# Patient Record
Sex: FEMALE | Race: WHITE | NOT HISPANIC OR LATINO | URBAN - METROPOLITAN AREA
[De-identification: names, ages, dates, MRNs, and addresses within clinical notes are randomized per-mention and may not be internally consistent; named-entity substitution may affect disease eponyms.]

---

## 2017-01-25 ENCOUNTER — OFFICE (AMBULATORY)
Dept: URBAN - METROPOLITAN AREA CLINIC 75 | Facility: CLINIC | Age: 42
End: 2017-01-25
Payer: COMMERCIAL

## 2017-01-25 VITALS
HEIGHT: 69 IN | DIASTOLIC BLOOD PRESSURE: 80 MMHG | WEIGHT: 236 LBS | SYSTOLIC BLOOD PRESSURE: 124 MMHG | HEART RATE: 72 BPM

## 2017-01-25 DIAGNOSIS — R13.10 DYSPHAGIA, UNSPECIFIED: ICD-10-CM

## 2017-01-25 DIAGNOSIS — K59.00 CONSTIPATION, UNSPECIFIED: ICD-10-CM

## 2017-01-25 DIAGNOSIS — K31.84 GASTROPARESIS: ICD-10-CM

## 2017-01-25 DIAGNOSIS — E11.9 TYPE 2 DIABETES MELLITUS WITHOUT COMPLICATIONS: ICD-10-CM

## 2017-01-25 DIAGNOSIS — R12 HEARTBURN: ICD-10-CM

## 2017-01-25 DIAGNOSIS — R11.2 NAUSEA WITH VOMITING, UNSPECIFIED: ICD-10-CM

## 2017-01-25 DIAGNOSIS — Q45.3 OTHER CONGENITAL MALFORMATIONS OF PANCREAS AND PANCREATIC DU: ICD-10-CM

## 2017-01-25 DIAGNOSIS — R94.5 ABNORMAL RESULTS OF LIVER FUNCTION STUDIES: ICD-10-CM

## 2017-01-25 DIAGNOSIS — R10.11 RIGHT UPPER QUADRANT PAIN: ICD-10-CM

## 2017-01-25 DIAGNOSIS — Z83.71 FAMILY HISTORY OF COLONIC POLYPS: ICD-10-CM

## 2017-01-25 DIAGNOSIS — K21.0 GASTRO-ESOPHAGEAL REFLUX DISEASE WITH ESOPHAGITIS: ICD-10-CM

## 2017-01-25 DIAGNOSIS — R10.13 EPIGASTRIC PAIN: ICD-10-CM

## 2017-01-25 PROCEDURE — 99213 OFFICE O/P EST LOW 20 MIN: CPT | Performed by: INTERNAL MEDICINE

## 2017-03-10 ENCOUNTER — OFFICE VISIT (OUTPATIENT)
Dept: ENDOCRINOLOGY | Age: 42
End: 2017-03-10

## 2017-03-10 VITALS
SYSTOLIC BLOOD PRESSURE: 130 MMHG | DIASTOLIC BLOOD PRESSURE: 80 MMHG | BODY MASS INDEX: 35.66 KG/M2 | HEIGHT: 69 IN | WEIGHT: 240.8 LBS

## 2017-03-10 DIAGNOSIS — E66.9 ADIPOSITY: ICD-10-CM

## 2017-03-10 DIAGNOSIS — E55.9 VITAMIN D DEFICIENCY: ICD-10-CM

## 2017-03-10 DIAGNOSIS — E78.5 HYPERLIPIDEMIA, UNSPECIFIED HYPERLIPIDEMIA TYPE: ICD-10-CM

## 2017-03-10 DIAGNOSIS — E28.2 BILATERAL POLYCYSTIC OVARIAN SYNDROME: ICD-10-CM

## 2017-03-10 DIAGNOSIS — E11.41 DIABETIC MONONEUROPATHY ASSOCIATED WITH TYPE 2 DIABETES MELLITUS (HCC): ICD-10-CM

## 2017-03-10 DIAGNOSIS — E11.9 TYPE 2 DIABETES MELLITUS WITHOUT COMPLICATION, UNSPECIFIED LONG TERM INSULIN USE STATUS: Primary | ICD-10-CM

## 2017-03-10 DIAGNOSIS — Z46.81 INSULIN PUMP TITRATION: ICD-10-CM

## 2017-03-10 PROCEDURE — 99214 OFFICE O/P EST MOD 30 MIN: CPT | Performed by: NURSE PRACTITIONER

## 2017-03-10 RX ORDER — LACTULOSE 20 G/30ML
20 SOLUTION ORAL AS NEEDED
COMMUNITY

## 2017-03-10 RX ORDER — ICOSAPENT ETHYL 1000 MG/1
2 CAPSULE ORAL 2 TIMES DAILY WITH MEALS
Qty: 360 CAPSULE | Refills: 1 | Status: SHIPPED | OUTPATIENT
Start: 2017-03-10 | End: 2018-01-18

## 2017-03-10 NOTE — PATIENT INSTRUCTIONS
Follow up in 4 months with labs prior  Glucagon kit  vascepa 1g 2 pills twice daily with meals  Continue same medications   Lab orders
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
## 2017-03-17 ENCOUNTER — APPOINTMENT (OUTPATIENT)
Dept: BONE DENSITY | Facility: HOSPITAL | Age: 42
End: 2017-03-17

## 2017-05-24 ENCOUNTER — OFFICE (AMBULATORY)
Dept: URBAN - METROPOLITAN AREA CLINIC 75 | Facility: CLINIC | Age: 42
End: 2017-05-24
Payer: COMMERCIAL

## 2017-05-24 VITALS
HEIGHT: 69 IN | SYSTOLIC BLOOD PRESSURE: 130 MMHG | WEIGHT: 239 LBS | DIASTOLIC BLOOD PRESSURE: 88 MMHG | HEART RATE: 68 BPM

## 2017-05-24 DIAGNOSIS — R13.10 DYSPHAGIA, UNSPECIFIED: ICD-10-CM

## 2017-05-24 DIAGNOSIS — R10.11 RIGHT UPPER QUADRANT PAIN: ICD-10-CM

## 2017-05-24 DIAGNOSIS — Q45.3 OTHER CONGENITAL MALFORMATIONS OF PANCREAS AND PANCREATIC DU: ICD-10-CM

## 2017-05-24 DIAGNOSIS — K59.00 CONSTIPATION, UNSPECIFIED: ICD-10-CM

## 2017-05-24 DIAGNOSIS — E11.9 TYPE 2 DIABETES MELLITUS WITHOUT COMPLICATIONS: ICD-10-CM

## 2017-05-24 DIAGNOSIS — K21.0 GASTRO-ESOPHAGEAL REFLUX DISEASE WITH ESOPHAGITIS: ICD-10-CM

## 2017-05-24 DIAGNOSIS — R11.2 NAUSEA WITH VOMITING, UNSPECIFIED: ICD-10-CM

## 2017-05-24 DIAGNOSIS — R94.5 ABNORMAL RESULTS OF LIVER FUNCTION STUDIES: ICD-10-CM

## 2017-05-24 DIAGNOSIS — R12 HEARTBURN: ICD-10-CM

## 2017-05-24 DIAGNOSIS — R10.13 EPIGASTRIC PAIN: ICD-10-CM

## 2017-05-24 DIAGNOSIS — Z83.71 FAMILY HISTORY OF COLONIC POLYPS: ICD-10-CM

## 2017-05-24 DIAGNOSIS — K31.84 GASTROPARESIS: ICD-10-CM

## 2017-05-24 PROCEDURE — 99213 OFFICE O/P EST LOW 20 MIN: CPT | Performed by: INTERNAL MEDICINE

## 2017-07-10 ENCOUNTER — RESULTS ENCOUNTER (OUTPATIENT)
Dept: ENDOCRINOLOGY | Age: 42
End: 2017-07-10

## 2017-07-10 DIAGNOSIS — E11.41 DIABETIC MONONEUROPATHY ASSOCIATED WITH TYPE 2 DIABETES MELLITUS (HCC): ICD-10-CM

## 2017-07-10 DIAGNOSIS — E78.5 HYPERLIPIDEMIA, UNSPECIFIED HYPERLIPIDEMIA TYPE: ICD-10-CM

## 2017-07-10 DIAGNOSIS — E11.9 TYPE 2 DIABETES MELLITUS WITHOUT COMPLICATION, UNSPECIFIED LONG TERM INSULIN USE STATUS: ICD-10-CM

## 2017-07-10 DIAGNOSIS — E28.2 BILATERAL POLYCYSTIC OVARIAN SYNDROME: ICD-10-CM

## 2017-07-10 DIAGNOSIS — Z46.81 INSULIN PUMP TITRATION: ICD-10-CM

## 2017-07-10 DIAGNOSIS — E55.9 VITAMIN D DEFICIENCY: ICD-10-CM

## 2017-07-10 DIAGNOSIS — E66.9 ADIPOSITY: ICD-10-CM

## 2017-09-26 ENCOUNTER — OFFICE (AMBULATORY)
Dept: URBAN - METROPOLITAN AREA CLINIC 75 | Facility: CLINIC | Age: 42
End: 2017-09-26
Payer: COMMERCIAL

## 2017-09-26 VITALS
SYSTOLIC BLOOD PRESSURE: 116 MMHG | WEIGHT: 233 LBS | HEART RATE: 60 BPM | DIASTOLIC BLOOD PRESSURE: 70 MMHG | HEIGHT: 69 IN

## 2017-09-26 DIAGNOSIS — E11.9 TYPE 2 DIABETES MELLITUS WITHOUT COMPLICATIONS: ICD-10-CM

## 2017-09-26 DIAGNOSIS — R11.2 NAUSEA WITH VOMITING, UNSPECIFIED: ICD-10-CM

## 2017-09-26 DIAGNOSIS — R19.4 CHANGE IN BOWEL HABIT: ICD-10-CM

## 2017-09-26 DIAGNOSIS — Q45.3 OTHER CONGENITAL MALFORMATIONS OF PANCREAS AND PANCREATIC DU: ICD-10-CM

## 2017-09-26 DIAGNOSIS — R12 HEARTBURN: ICD-10-CM

## 2017-09-26 DIAGNOSIS — K31.84 GASTROPARESIS: ICD-10-CM

## 2017-09-26 DIAGNOSIS — K21.0 GASTRO-ESOPHAGEAL REFLUX DISEASE WITH ESOPHAGITIS: ICD-10-CM

## 2017-09-26 DIAGNOSIS — Z83.71 FAMILY HISTORY OF COLONIC POLYPS: ICD-10-CM

## 2017-09-26 DIAGNOSIS — R94.5 ABNORMAL RESULTS OF LIVER FUNCTION STUDIES: ICD-10-CM

## 2017-09-26 DIAGNOSIS — R13.10 DYSPHAGIA, UNSPECIFIED: ICD-10-CM

## 2017-09-26 PROCEDURE — 99214 OFFICE O/P EST MOD 30 MIN: CPT | Performed by: INTERNAL MEDICINE

## 2017-09-26 RX ORDER — ONDANSETRON 8 MG/1
TABLET, ORALLY DISINTEGRATING ORAL
Qty: 90 | Refills: 4 | Status: ACTIVE

## 2017-09-26 RX ORDER — HYOSCYAMINE SULFATE 0.12 MG/1
TABLET SUBLINGUAL
Qty: 60 | Refills: 3 | Status: COMPLETED
Start: 2017-09-26 | End: 2024-08-01

## 2017-11-18 DIAGNOSIS — K75.81 NASH (NONALCOHOLIC STEATOHEPATITIS): ICD-10-CM

## 2017-11-18 DIAGNOSIS — E55.9 AVITAMINOSIS D: ICD-10-CM

## 2017-11-18 DIAGNOSIS — E11.9 DIABETES MELLITUS TYPE 2, NONINSULIN DEPENDENT (HCC): ICD-10-CM

## 2017-11-18 DIAGNOSIS — R80.9 ABNORMAL PRESENCE OF PROTEIN IN URINE: ICD-10-CM

## 2018-01-04 DIAGNOSIS — E55.9 AVITAMINOSIS D: ICD-10-CM

## 2018-01-04 DIAGNOSIS — K75.81 NASH (NONALCOHOLIC STEATOHEPATITIS): ICD-10-CM

## 2018-01-04 DIAGNOSIS — R80.9 ABNORMAL PRESENCE OF PROTEIN IN URINE: ICD-10-CM

## 2018-01-04 DIAGNOSIS — E11.9 DIABETES MELLITUS TYPE 2, NONINSULIN DEPENDENT (HCC): ICD-10-CM

## 2018-01-09 DIAGNOSIS — E11.9 DIABETES MELLITUS TYPE 2, NONINSULIN DEPENDENT (HCC): ICD-10-CM

## 2018-01-09 DIAGNOSIS — E55.9 AVITAMINOSIS D: ICD-10-CM

## 2018-01-09 DIAGNOSIS — K75.81 NASH (NONALCOHOLIC STEATOHEPATITIS): ICD-10-CM

## 2018-01-11 DIAGNOSIS — E55.9 VITAMIN D DEFICIENCY: ICD-10-CM

## 2018-01-11 DIAGNOSIS — E11.9 TYPE 2 DIABETES MELLITUS WITHOUT COMPLICATION, UNSPECIFIED LONG TERM INSULIN USE STATUS: Primary | ICD-10-CM

## 2018-01-17 ENCOUNTER — OFFICE (AMBULATORY)
Dept: URBAN - METROPOLITAN AREA CLINIC 75 | Facility: CLINIC | Age: 43
End: 2018-01-17
Payer: COMMERCIAL

## 2018-01-17 VITALS
HEIGHT: 69 IN | WEIGHT: 245 LBS | SYSTOLIC BLOOD PRESSURE: 128 MMHG | DIASTOLIC BLOOD PRESSURE: 82 MMHG | HEART RATE: 64 BPM

## 2018-01-17 DIAGNOSIS — R13.10 DYSPHAGIA, UNSPECIFIED: ICD-10-CM

## 2018-01-17 DIAGNOSIS — R11.2 NAUSEA WITH VOMITING, UNSPECIFIED: ICD-10-CM

## 2018-01-17 DIAGNOSIS — K59.1 FUNCTIONAL DIARRHEA: ICD-10-CM

## 2018-01-17 DIAGNOSIS — Q45.3 OTHER CONGENITAL MALFORMATIONS OF PANCREAS AND PANCREATIC DU: ICD-10-CM

## 2018-01-17 DIAGNOSIS — E11.9 TYPE 2 DIABETES MELLITUS WITHOUT COMPLICATIONS: ICD-10-CM

## 2018-01-17 DIAGNOSIS — R12 HEARTBURN: ICD-10-CM

## 2018-01-17 DIAGNOSIS — Z83.71 FAMILY HISTORY OF COLONIC POLYPS: ICD-10-CM

## 2018-01-17 DIAGNOSIS — R94.5 ABNORMAL RESULTS OF LIVER FUNCTION STUDIES: ICD-10-CM

## 2018-01-17 DIAGNOSIS — K21.0 GASTRO-ESOPHAGEAL REFLUX DISEASE WITH ESOPHAGITIS: ICD-10-CM

## 2018-01-17 DIAGNOSIS — R10.13 EPIGASTRIC PAIN: ICD-10-CM

## 2018-01-17 DIAGNOSIS — K59.00 CONSTIPATION, UNSPECIFIED: ICD-10-CM

## 2018-01-17 DIAGNOSIS — K31.84 GASTROPARESIS: ICD-10-CM

## 2018-01-17 PROCEDURE — 99214 OFFICE O/P EST MOD 30 MIN: CPT | Performed by: INTERNAL MEDICINE

## 2018-01-17 RX ORDER — LACTULOSE 10 G/15ML
600 SOLUTION ORAL
Qty: 1800 | Refills: 6 | Status: COMPLETED
Start: 2016-03-25 | End: 2022-12-13

## 2018-01-18 ENCOUNTER — OFFICE VISIT (OUTPATIENT)
Dept: ENDOCRINOLOGY | Age: 43
End: 2018-01-18

## 2018-01-18 VITALS
DIASTOLIC BLOOD PRESSURE: 72 MMHG | HEIGHT: 69 IN | RESPIRATION RATE: 16 BRPM | BODY MASS INDEX: 36.61 KG/M2 | SYSTOLIC BLOOD PRESSURE: 118 MMHG | WEIGHT: 247.2 LBS

## 2018-01-18 DIAGNOSIS — E55.9 AVITAMINOSIS D: ICD-10-CM

## 2018-01-18 DIAGNOSIS — E11.9 TYPE 2 DIABETES MELLITUS WITHOUT COMPLICATION, UNSPECIFIED LONG TERM INSULIN USE STATUS: Primary | ICD-10-CM

## 2018-01-18 DIAGNOSIS — E11.41 DIABETIC MONONEUROPATHY ASSOCIATED WITH TYPE 2 DIABETES MELLITUS (HCC): ICD-10-CM

## 2018-01-18 DIAGNOSIS — Z46.81 INSULIN PUMP TITRATION: ICD-10-CM

## 2018-01-18 DIAGNOSIS — E66.9 GENERALIZED OBESITY: ICD-10-CM

## 2018-01-18 DIAGNOSIS — E28.2 BILATERAL POLYCYSTIC OVARIAN SYNDROME: ICD-10-CM

## 2018-01-18 DIAGNOSIS — E78.5 HYPERLIPIDEMIA, UNSPECIFIED HYPERLIPIDEMIA TYPE: ICD-10-CM

## 2018-01-18 DIAGNOSIS — K75.81 NASH (NONALCOHOLIC STEATOHEPATITIS): ICD-10-CM

## 2018-01-18 DIAGNOSIS — E55.9 VITAMIN D DEFICIENCY: ICD-10-CM

## 2018-01-18 PROCEDURE — 99214 OFFICE O/P EST MOD 30 MIN: CPT | Performed by: NURSE PRACTITIONER

## 2018-01-18 RX ORDER — ONDANSETRON 8 MG/1
8 TABLET, ORALLY DISINTEGRATING ORAL EVERY 8 HOURS PRN
COMMUNITY

## 2018-01-18 NOTE — PROGRESS NOTES
"Melinda Colon is a 42 y.o. female is here today for follow-up.  Chief Complaint   Patient presents with   • Diabetes     checking BG as needed; using Omnipod; has concerns about kidney function   • Vitamin D Deficiency     /72  Resp 16  Ht 175.3 cm (69\")  Wt 112 kg (247 lb 3.2 oz)  BMI 36.51 kg/m2  Current Outpatient Prescriptions on File Prior to Visit   Medication Sig   • Esomeprazole Magnesium (NEXIUM PO) Take 40 mg by mouth 2 (Two) Times a Day. Pt is using the powder form   • Insulin Infusion Pump device Inject 1 kit under the skin.   • lactulose 20 GM/30ML solution solution Take 20 g by mouth As Needed.   • rizatriptan (MAXALT) 10 MG tablet Take 10 mg by mouth 1 (one) time as needed.   • rotigotine (NEUPRO) 1 MG/24HR patch 24 hour 24 hour patch Place 1 mg on the skin.   • [DISCONTINUED] glucose blood test strip FreeStyle Test In Vitro Strip; Patient Sig: FreeStyle Test In Vitro Strip 4 times q day and prn  as directed; 150; 3; 21-Aug-2014; Active   • [DISCONTINUED] Insulin Disposable Pump (OMNIPOD) misc 1 each.   • [DISCONTINUED] insulin lispro (HUMALOG) 100 UNIT/ML injection Up to 300 units daily via insulin pump   • diphenhydrAMINE (Q-DRYL) 12.5 MG/5ML liquid TK 10 ML PO Q 4 TO6 H PRF ITCHING   • glucagon (GLUCAGON EMERGENCY) 1 MG injection Inject 1 mg under the skin 1 (One) Time As Needed for low blood sugar for up to 1 dose.   • prochlorperazine (COMPAZINE) 25 MG suppository Compazine 25 MG Rectal Suppository; Patient Sig: Compazine 25 MG Rectal Suppository INSERT MG  PRN; 0; 21-Aug-2014; Active   • [DISCONTINUED] Cholecalciferol (VITAMIN D3) 5000 UNIT/ML liquid Take 5,000 Units by mouth Daily.   • [DISCONTINUED] VASCEPA 1 G capsule capsule Take 2 g by mouth 2 (Two) Times a Day With Meals.     No current facility-administered medications on file prior to visit.      History reviewed. No pertinent family history.  Social History   Substance Use Topics   • Smoking status: Light " Tobacco Smoker   • Smokeless tobacco: None   • Alcohol use None     Allergies   Allergen Reactions   • Cephalexin Nausea And Vomiting   • Doxycycline Nausea And Vomiting   • Erythromycin Itching and Nausea And Vomiting   • Hydrocodone Nausea And Vomiting and Rash   • Metoclopramide Other (See Comments)     FACIAL SPASMS  FACIAL SPASMS   • Morphine Itching and Nausea And Vomiting   • Promethazine Hcl Other (See Comments)     MUSCLE CONTRACTIONS  MUSCLE CONTRACTIONS   • Sulfa Antibiotics Nausea And Vomiting and Rash   • Vancomycin Nausea And Vomiting and Rash   • Codeine Rash   • Chlorhexidine Itching   • Chlorhexidine Gluconate Itching   • Clindamycin Nausea And Vomiting   • Ketorolac Tromethamine    • Morphine And Related    • Oxycodone Nausea And Vomiting   • Promethazine    • Sulfur    • Tramadol Nausea And Vomiting   • Latex Hives and Rash   • Tape Rash         History of Present Illness  Encounter Diagnoses   Name Primary?   • Hyperlipidemia, unspecified hyperlipidemia type    • Vitamin D deficiency    • Diabetic mononeuropathy associated with type 2 diabetes mellitus    • Insulin pump titration    • Type 2 diabetes mellitus without complication, unspecified long term insulin use status Yes   • Bilateral polycystic ovarian syndrome    • Avitaminosis D    • OGDEN (nonalcoholic steatohepatitis)    • Generalized obesity      This is a 42-year-old female patient here today for a follow-up visit.  She has no showed her last 2 appointments.  She was needing her medications refilled for main appointment to come in.  She states she will occasionally have low blood sugars in the 50 range.  She is currently on an insulin pump and is only tested her blood sugars several times a week.  She is almost out of omni-pod supplies and is needing a prescription sent to her specialty company [employer].  She is lethargic at today's visit.  She had recent labs done prior to her visit and today.  Her labs from [facility]  were reviewed and her hemoglobin A1c is 6.9.  The following portions of the patient's history were reviewed and updated as appropriate: allergies, current medications, past family history, past medical history, past social history, past surgical history and problem list.    Review of Systems   Constitutional: Negative for fatigue and unexpected weight change.   Endocrine: Negative for heat intolerance.   Psychiatric/Behavioral: Negative for sleep disturbance.       Objective   Physical Exam   Constitutional: She is oriented to person, place, and time. She appears well-developed and well-nourished. No distress.   HENT:   Head: Normocephalic and atraumatic.   Right Ear: External ear normal.   Left Ear: External ear normal.   Nose: Nose normal.   Mouth/Throat: Oropharynx is clear and moist. No oropharyngeal exudate.   Eyes: EOM are normal. Pupils are equal, round, and reactive to light. Right eye exhibits no discharge. Left eye exhibits no discharge.   Neck: Trachea normal, normal range of motion and full passive range of motion without pain. Neck supple. No tracheal tenderness present. Carotid bruit is not present. No tracheal deviation, no edema and no erythema present. No thyroid mass and no thyromegaly present.   Cardiovascular: Normal rate, regular rhythm, normal heart sounds and intact distal pulses.  Exam reveals no gallop and no friction rub.    No murmur heard.  Pulmonary/Chest: Effort normal and breath sounds normal. No stridor. No respiratory distress. She has no wheezes. She has no rales.   Abdominal: Soft. Bowel sounds are normal. She exhibits no distension.   Musculoskeletal: Normal range of motion. She exhibits no edema or deformity.   Lymphadenopathy:     She has no cervical adenopathy.   Neurological: She is alert and oriented to person, place, and time.   Skin: Skin is warm and dry. No rash noted. She is not diaphoretic. No erythema. No pallor.   Psychiatric: She has a normal mood and affect. Her  behavior is normal. Judgment and thought content normal.   Nursing note and vitals reviewed.        Assessment/Plan   Problems Addressed this Visit        Cardiovascular and Mediastinum    Hyperlipidemia    Relevant Medications    ondansetron ODT (ZOFRAN-ODT) 8 MG disintegrating tablet    Cholecalciferol (VITAMIN D3) 5000 UNIT/ML liquid    glucose blood test strip    Insulin Disposable Pump (OMNIPOD) misc    insulin lispro (HUMALOG) 100 UNIT/ML injection    Other Relevant Orders    Ambulatory Referral to Nephrology    Comprehensive Metabolic Panel    C-Peptide    Hemoglobin A1c    Lipid Panel    MicroAlbumin, Urine, Random - Urine, Clean Catch    T3, Free    T4, Free    Thyroid Panel With TSH    Vitamin D 25 Hydroxy       Digestive    OGDEN (nonalcoholic steatohepatitis)    Relevant Medications    ondansetron ODT (ZOFRAN-ODT) 8 MG disintegrating tablet    Cholecalciferol (VITAMIN D3) 5000 UNIT/ML liquid    glucose blood test strip    Insulin Disposable Pump (OMNIPOD) misc    insulin lispro (HUMALOG) 100 UNIT/ML injection    Other Relevant Orders    Ambulatory Referral to Nephrology    Comprehensive Metabolic Panel    C-Peptide    Hemoglobin A1c    Lipid Panel    MicroAlbumin, Urine, Random - Urine, Clean Catch    T3, Free    T4, Free    Thyroid Panel With TSH    Vitamin D 25 Hydroxy    Vitamin D deficiency    Relevant Medications    ondansetron ODT (ZOFRAN-ODT) 8 MG disintegrating tablet    Cholecalciferol (VITAMIN D3) 5000 UNIT/ML liquid    glucose blood test strip    Insulin Disposable Pump (OMNIPOD) misc    insulin lispro (HUMALOG) 100 UNIT/ML injection    Other Relevant Orders    Ambulatory Referral to Nephrology    Comprehensive Metabolic Panel    C-Peptide    Hemoglobin A1c    Lipid Panel    MicroAlbumin, Urine, Random - Urine, Clean Catch    T3, Free    T4, Free    Thyroid Panel With TSH    Vitamin D 25 Hydroxy    Generalized obesity    Relevant Medications    ondansetron ODT (ZOFRAN-ODT) 8 MG disintegrating  tablet    Cholecalciferol (VITAMIN D3) 5000 UNIT/ML liquid    glucose blood test strip    Insulin Disposable Pump (OMNIPOD) misc    insulin lispro (HUMALOG) 100 UNIT/ML injection    Other Relevant Orders    Ambulatory Referral to Nephrology    Comprehensive Metabolic Panel    C-Peptide    Hemoglobin A1c    Lipid Panel    MicroAlbumin, Urine, Random - Urine, Clean Catch    T3, Free    T4, Free    Thyroid Panel With TSH    Vitamin D 25 Hydroxy    Avitaminosis D    Relevant Medications    ondansetron ODT (ZOFRAN-ODT) 8 MG disintegrating tablet    Cholecalciferol (VITAMIN D3) 5000 UNIT/ML liquid    glucose blood test strip    Insulin Disposable Pump (OMNIPOD) misc    insulin lispro (HUMALOG) 100 UNIT/ML injection    Other Relevant Orders    Ambulatory Referral to Nephrology    Comprehensive Metabolic Panel    C-Peptide    Hemoglobin A1c    Lipid Panel    MicroAlbumin, Urine, Random - Urine, Clean Catch    T3, Free    T4, Free    Thyroid Panel With TSH    Vitamin D 25 Hydroxy       Endocrine    Bilateral polycystic ovarian syndrome    Relevant Medications    ondansetron ODT (ZOFRAN-ODT) 8 MG disintegrating tablet    Cholecalciferol (VITAMIN D3) 5000 UNIT/ML liquid    glucose blood test strip    Insulin Disposable Pump (OMNIPOD) misc    insulin lispro (HUMALOG) 100 UNIT/ML injection    Other Relevant Orders    Ambulatory Referral to Nephrology    Comprehensive Metabolic Panel    C-Peptide    Hemoglobin A1c    Lipid Panel    MicroAlbumin, Urine, Random - Urine, Clean Catch    T3, Free    T4, Free    Thyroid Panel With TSH    Vitamin D 25 Hydroxy    Type 2 diabetes mellitus - Primary    Relevant Medications    ondansetron ODT (ZOFRAN-ODT) 8 MG disintegrating tablet    Cholecalciferol (VITAMIN D3) 5000 UNIT/ML liquid    glucose blood test strip    Insulin Disposable Pump (OMNIPOD) misc    insulin lispro (HUMALOG) 100 UNIT/ML injection    Other Relevant Orders    Ambulatory Referral to Nephrology    Comprehensive Metabolic  Panel    C-Peptide    Hemoglobin A1c    Lipid Panel    MicroAlbumin, Urine, Random - Urine, Clean Catch    T3, Free    T4, Free    Thyroid Panel With TSH    Vitamin D 25 Hydroxy    Diabetic neuropathy    Relevant Medications    ondansetron ODT (ZOFRAN-ODT) 8 MG disintegrating tablet    Cholecalciferol (VITAMIN D3) 5000 UNIT/ML liquid    glucose blood test strip    Insulin Disposable Pump (OMNIPOD) misc    insulin lispro (HUMALOG) 100 UNIT/ML injection    Other Relevant Orders    Ambulatory Referral to Nephrology    Comprehensive Metabolic Panel    C-Peptide    Hemoglobin A1c    Lipid Panel    MicroAlbumin, Urine, Random - Urine, Clean Catch    T3, Free    T4, Free    Thyroid Panel With TSH    Vitamin D 25 Hydroxy       Other    Insulin pump titration    Relevant Medications    ondansetron ODT (ZOFRAN-ODT) 8 MG disintegrating tablet    Cholecalciferol (VITAMIN D3) 5000 UNIT/ML liquid    glucose blood test strip    Insulin Disposable Pump (OMNIPOD) misc    insulin lispro (HUMALOG) 100 UNIT/ML injection    Other Relevant Orders    Ambulatory Referral to Nephrology    Comprehensive Metabolic Panel    C-Peptide    Hemoglobin A1c    Lipid Panel    MicroAlbumin, Urine, Random - Urine, Clean Catch    T3, Free    T4, Free    Thyroid Panel With TSH    Vitamin D 25 Hydroxy          In summary, patient was seen and examined.  Her medications were refilled for 6 months.  She will need to keep her scheduled appointments in order to keep her medications refilled.  Her last A1c was 6.9.  Her triglycerides are elevated however she states she is not a candidate to take fish oil because she can take no pills by mouth.  She is currently taking an injection for her cholesterol which we think is praluent that she receives samples from from her cardiologist.  She has low vitamin D and takes liquid vitamin D.  She has a port that blood is drawn from and she is requesting to have labs done prior to her next visit to report.  Orders were  placed metabolically she is stable.  Her blood pressure is an satisfactory range.  She is expressed concern over her kidney function proteinuria.She will be referred to nephrologist withher history of type 2 diabetes.She is return in 4-6 months

## 2018-02-11 ENCOUNTER — RESULTS ENCOUNTER (OUTPATIENT)
Dept: ENDOCRINOLOGY | Age: 43
End: 2018-02-11

## 2018-02-11 DIAGNOSIS — E55.9 VITAMIN D DEFICIENCY: ICD-10-CM

## 2018-02-11 DIAGNOSIS — E11.9 TYPE 2 DIABETES MELLITUS WITHOUT COMPLICATION, UNSPECIFIED LONG TERM INSULIN USE STATUS: ICD-10-CM

## 2018-02-24 DIAGNOSIS — E55.9 AVITAMINOSIS D: ICD-10-CM

## 2018-02-24 DIAGNOSIS — E11.9 DIABETES MELLITUS TYPE 2, NONINSULIN DEPENDENT (HCC): ICD-10-CM

## 2018-02-24 DIAGNOSIS — K75.81 NASH (NONALCOHOLIC STEATOHEPATITIS): ICD-10-CM

## 2018-04-25 ENCOUNTER — OFFICE (AMBULATORY)
Dept: URBAN - METROPOLITAN AREA CLINIC 75 | Facility: CLINIC | Age: 43
End: 2018-04-25
Payer: COMMERCIAL

## 2018-04-25 VITALS
HEIGHT: 69 IN | DIASTOLIC BLOOD PRESSURE: 80 MMHG | HEART RATE: 60 BPM | SYSTOLIC BLOOD PRESSURE: 122 MMHG | WEIGHT: 255 LBS

## 2018-04-25 DIAGNOSIS — R11.2 NAUSEA WITH VOMITING, UNSPECIFIED: ICD-10-CM

## 2018-04-25 DIAGNOSIS — E11.9 TYPE 2 DIABETES MELLITUS WITHOUT COMPLICATIONS: ICD-10-CM

## 2018-04-25 DIAGNOSIS — K21.0 GASTRO-ESOPHAGEAL REFLUX DISEASE WITH ESOPHAGITIS: ICD-10-CM

## 2018-04-25 DIAGNOSIS — K31.84 GASTROPARESIS: ICD-10-CM

## 2018-04-25 DIAGNOSIS — R13.10 DYSPHAGIA, UNSPECIFIED: ICD-10-CM

## 2018-04-25 DIAGNOSIS — K58.9 IRRITABLE BOWEL SYNDROME WITHOUT DIARRHEA: ICD-10-CM

## 2018-04-25 DIAGNOSIS — K59.00 CONSTIPATION, UNSPECIFIED: ICD-10-CM

## 2018-04-25 DIAGNOSIS — R12 HEARTBURN: ICD-10-CM

## 2018-04-25 DIAGNOSIS — R94.5 ABNORMAL RESULTS OF LIVER FUNCTION STUDIES: ICD-10-CM

## 2018-04-25 DIAGNOSIS — Z83.71 FAMILY HISTORY OF COLONIC POLYPS: ICD-10-CM

## 2018-04-25 DIAGNOSIS — R10.13 EPIGASTRIC PAIN: ICD-10-CM

## 2018-04-25 DIAGNOSIS — Q45.3 OTHER CONGENITAL MALFORMATIONS OF PANCREAS AND PANCREATIC DU: ICD-10-CM

## 2018-04-25 PROCEDURE — 99213 OFFICE O/P EST LOW 20 MIN: CPT | Performed by: INTERNAL MEDICINE

## 2018-06-21 DIAGNOSIS — E11.9 DIABETES MELLITUS TYPE 2, NONINSULIN DEPENDENT (HCC): ICD-10-CM

## 2018-06-21 DIAGNOSIS — K75.81 NASH (NONALCOHOLIC STEATOHEPATITIS): ICD-10-CM

## 2018-06-21 DIAGNOSIS — E55.9 AVITAMINOSIS D: ICD-10-CM

## 2018-06-28 ENCOUNTER — TELEPHONE (OUTPATIENT)
Dept: ENDOCRINOLOGY | Age: 43
End: 2018-06-28

## 2018-06-28 DIAGNOSIS — E11.9 TYPE 2 DIABETES MELLITUS WITHOUT COMPLICATION, UNSPECIFIED LONG TERM INSULIN USE STATUS: Primary | ICD-10-CM

## 2018-06-28 DIAGNOSIS — E28.2 BILATERAL POLYCYSTIC OVARIAN SYNDROME: ICD-10-CM

## 2018-06-28 DIAGNOSIS — E55.9 AVITAMINOSIS D: ICD-10-CM

## 2018-06-28 NOTE — TELEPHONE ENCOUNTER
----- Message from Durga Lee sent at 6/28/2018 11:46 AM EDT -----  Contact: PHARMACY  [facility] CALLED TO GET LAB ORDERS SENT OVER FOR PATIENT WHO WILL HAVE LABS DRAWN IN THE MORNING.     PHONE [phone] MARII FOR ANY QUESTIONS  [phone]      Labs have been entered and faxed.

## 2018-07-18 ENCOUNTER — RESULTS ENCOUNTER (OUTPATIENT)
Dept: ENDOCRINOLOGY | Age: 43
End: 2018-07-18

## 2018-07-18 DIAGNOSIS — K75.81 NASH (NONALCOHOLIC STEATOHEPATITIS): ICD-10-CM

## 2018-07-18 DIAGNOSIS — E55.9 VITAMIN D DEFICIENCY: ICD-10-CM

## 2018-07-18 DIAGNOSIS — E78.5 HYPERLIPIDEMIA, UNSPECIFIED HYPERLIPIDEMIA TYPE: ICD-10-CM

## 2018-07-18 DIAGNOSIS — E11.9 TYPE 2 DIABETES MELLITUS WITHOUT COMPLICATION, UNSPECIFIED LONG TERM INSULIN USE STATUS: ICD-10-CM

## 2018-07-18 DIAGNOSIS — E55.9 AVITAMINOSIS D: ICD-10-CM

## 2018-07-18 DIAGNOSIS — Z46.81 INSULIN PUMP TITRATION: ICD-10-CM

## 2018-07-18 DIAGNOSIS — E28.2 BILATERAL POLYCYSTIC OVARIAN SYNDROME: ICD-10-CM

## 2018-07-18 DIAGNOSIS — E66.9 GENERALIZED OBESITY: ICD-10-CM

## 2018-07-18 DIAGNOSIS — E11.41 DIABETIC MONONEUROPATHY ASSOCIATED WITH TYPE 2 DIABETES MELLITUS (HCC): ICD-10-CM

## 2018-07-28 ENCOUNTER — RESULTS ENCOUNTER (OUTPATIENT)
Dept: ENDOCRINOLOGY | Age: 43
End: 2018-07-28

## 2018-07-28 DIAGNOSIS — E55.9 AVITAMINOSIS D: ICD-10-CM

## 2018-07-28 DIAGNOSIS — E11.9 TYPE 2 DIABETES MELLITUS WITHOUT COMPLICATION, UNSPECIFIED LONG TERM INSULIN USE STATUS: ICD-10-CM

## 2018-07-28 DIAGNOSIS — E28.2 BILATERAL POLYCYSTIC OVARIAN SYNDROME: ICD-10-CM

## 2018-08-27 ENCOUNTER — TELEPHONE (OUTPATIENT)
Dept: ENDOCRINOLOGY | Age: 43
End: 2018-08-27

## 2018-08-27 DIAGNOSIS — E11.9 DIABETES MELLITUS TYPE 2, NONINSULIN DEPENDENT (HCC): ICD-10-CM

## 2018-08-27 DIAGNOSIS — E55.9 AVITAMINOSIS D: ICD-10-CM

## 2018-08-27 DIAGNOSIS — K75.81 NASH (NONALCOHOLIC STEATOHEPATITIS): ICD-10-CM
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
## 2018-08-28 ENCOUNTER — OFFICE (AMBULATORY)
Dept: URBAN - METROPOLITAN AREA CLINIC 75 | Facility: CLINIC | Age: 43
End: 2018-08-28
Payer: COMMERCIAL

## 2018-08-28 VITALS
HEART RATE: 63 BPM | SYSTOLIC BLOOD PRESSURE: 132 MMHG | HEIGHT: 69 IN | DIASTOLIC BLOOD PRESSURE: 86 MMHG | WEIGHT: 262 LBS

## 2018-08-28 DIAGNOSIS — E11.9 TYPE 2 DIABETES MELLITUS WITHOUT COMPLICATIONS: ICD-10-CM

## 2018-08-28 DIAGNOSIS — R10.13 EPIGASTRIC PAIN: ICD-10-CM

## 2018-08-28 DIAGNOSIS — R94.5 ABNORMAL RESULTS OF LIVER FUNCTION STUDIES: ICD-10-CM

## 2018-08-28 DIAGNOSIS — K59.00 CONSTIPATION, UNSPECIFIED: ICD-10-CM

## 2018-08-28 DIAGNOSIS — Z83.71 FAMILY HISTORY OF COLONIC POLYPS: ICD-10-CM

## 2018-08-28 DIAGNOSIS — K58.9 IRRITABLE BOWEL SYNDROME WITHOUT DIARRHEA: ICD-10-CM

## 2018-08-28 DIAGNOSIS — K31.84 GASTROPARESIS: ICD-10-CM

## 2018-08-28 DIAGNOSIS — K21.0 GASTRO-ESOPHAGEAL REFLUX DISEASE WITH ESOPHAGITIS: ICD-10-CM

## 2018-08-28 DIAGNOSIS — R11.2 NAUSEA WITH VOMITING, UNSPECIFIED: ICD-10-CM

## 2018-08-28 DIAGNOSIS — R13.10 DYSPHAGIA, UNSPECIFIED: ICD-10-CM

## 2018-08-28 DIAGNOSIS — K59.1 FUNCTIONAL DIARRHEA: ICD-10-CM

## 2018-08-28 PROCEDURE — 99213 OFFICE O/P EST LOW 20 MIN: CPT | Performed by: INTERNAL MEDICINE

## 2018-08-28 RX ORDER — ONDANSETRON 8 MG/1
TABLET, ORALLY DISINTEGRATING ORAL
Qty: 90 | Refills: 4 | Status: ACTIVE

## 2018-08-28 RX ORDER — LACTULOSE 10 G/15ML
600 SOLUTION ORAL
Qty: 1800 | Refills: 6 | Status: COMPLETED
Start: 2016-03-25 | End: 2022-12-13

## 2018-12-04 VITALS
WEIGHT: 268 LBS | HEART RATE: 74 BPM | SYSTOLIC BLOOD PRESSURE: 126 MMHG | HEIGHT: 69 IN | DIASTOLIC BLOOD PRESSURE: 84 MMHG

## 2018-12-05 ENCOUNTER — OFFICE (AMBULATORY)
Dept: URBAN - METROPOLITAN AREA CLINIC 75 | Facility: CLINIC | Age: 43
End: 2018-12-05
Payer: COMMERCIAL

## 2018-12-05 DIAGNOSIS — K58.9 IRRITABLE BOWEL SYNDROME WITHOUT DIARRHEA: ICD-10-CM

## 2018-12-05 DIAGNOSIS — Z96.9 PRESENCE OF FUNCTIONAL IMPLANT, UNSPECIFIED: ICD-10-CM

## 2018-12-05 DIAGNOSIS — R13.10 DYSPHAGIA, UNSPECIFIED: ICD-10-CM

## 2018-12-05 DIAGNOSIS — Q45.3 OTHER CONGENITAL MALFORMATIONS OF PANCREAS AND PANCREATIC DU: ICD-10-CM

## 2018-12-05 DIAGNOSIS — R11.2 NAUSEA WITH VOMITING, UNSPECIFIED: ICD-10-CM

## 2018-12-05 DIAGNOSIS — E11.9 TYPE 2 DIABETES MELLITUS WITHOUT COMPLICATIONS: ICD-10-CM

## 2018-12-05 DIAGNOSIS — K31.84 GASTROPARESIS: ICD-10-CM

## 2018-12-05 DIAGNOSIS — K76.0 FATTY (CHANGE OF) LIVER, NOT ELSEWHERE CLASSIFIED: ICD-10-CM

## 2018-12-05 DIAGNOSIS — Z83.71 FAMILY HISTORY OF COLONIC POLYPS: ICD-10-CM

## 2018-12-05 DIAGNOSIS — R94.5 ABNORMAL RESULTS OF LIVER FUNCTION STUDIES: ICD-10-CM

## 2018-12-05 DIAGNOSIS — K59.00 CONSTIPATION, UNSPECIFIED: ICD-10-CM

## 2018-12-05 DIAGNOSIS — K21.0 GASTRO-ESOPHAGEAL REFLUX DISEASE WITH ESOPHAGITIS: ICD-10-CM

## 2018-12-05 PROCEDURE — 99214 OFFICE O/P EST MOD 30 MIN: CPT | Performed by: INTERNAL MEDICINE

## 2019-01-10 ENCOUNTER — TELEPHONE (OUTPATIENT)
Dept: ENDOCRINOLOGY | Age: 44
End: 2019-01-10

## 2019-01-10 DIAGNOSIS — E11.41 DIABETIC MONONEUROPATHY ASSOCIATED WITH TYPE 2 DIABETES MELLITUS (HCC): ICD-10-CM

## 2019-01-10 DIAGNOSIS — Z46.81 INSULIN PUMP TITRATION: ICD-10-CM

## 2019-01-10 DIAGNOSIS — E78.5 HYPERLIPIDEMIA, UNSPECIFIED HYPERLIPIDEMIA TYPE: ICD-10-CM

## 2019-01-10 DIAGNOSIS — E11.9 TYPE 2 DIABETES MELLITUS WITHOUT COMPLICATION (HCC): ICD-10-CM

## 2019-01-10 DIAGNOSIS — K75.81 NASH (NONALCOHOLIC STEATOHEPATITIS): ICD-10-CM

## 2019-01-10 DIAGNOSIS — E55.9 VITAMIN D DEFICIENCY: ICD-10-CM

## 2019-01-10 DIAGNOSIS — E66.9 GENERALIZED OBESITY: ICD-10-CM

## 2019-01-10 DIAGNOSIS — E55.9 AVITAMINOSIS D: ICD-10-CM

## 2019-01-10 DIAGNOSIS — E28.2 BILATERAL POLYCYSTIC OVARIAN SYNDROME: ICD-10-CM

## 2019-01-10 NOTE — TELEPHONE ENCOUNTER
rx sent    ----- Message from PETEY Bray sent at 1/9/2019  5:06 PM EST -----  Contact: RX  She can have a 30 day refill only and call for cancelations to get in sooner. She no showed her last appt  ----- Message -----  From: Betzaida Fleming MA  Sent: 1/9/2019   3:36 PM  To: PETEY Bray    Please advise. She no showed in july  ----- Message -----  From: Sarah Ceja MA  Sent: 1/9/2019   3:13 PM  To: Betzaida Fleming MA    Pt called requesting a rx be sent to her pharmacy for the omoni pod kit. Pt. Expressed of being upset because rx was deny several months ago.Said she doesn't know why,because she is a diabetic. Please sent to [facility]

## 2019-02-06 ENCOUNTER — OFFICE VISIT (OUTPATIENT)
Dept: ENDOCRINOLOGY | Age: 44
End: 2019-02-06

## 2019-02-06 VITALS
HEIGHT: 69 IN | DIASTOLIC BLOOD PRESSURE: 80 MMHG | BODY MASS INDEX: 39.99 KG/M2 | SYSTOLIC BLOOD PRESSURE: 138 MMHG | WEIGHT: 270 LBS

## 2019-02-06 DIAGNOSIS — E66.9 GENERALIZED OBESITY: ICD-10-CM

## 2019-02-06 DIAGNOSIS — E78.5 HYPERLIPIDEMIA, UNSPECIFIED HYPERLIPIDEMIA TYPE: ICD-10-CM

## 2019-02-06 DIAGNOSIS — K75.81 NASH (NONALCOHOLIC STEATOHEPATITIS): ICD-10-CM

## 2019-02-06 DIAGNOSIS — Z91.199 PATIENT NONCOMPLIANCE: ICD-10-CM

## 2019-02-06 DIAGNOSIS — E28.2 BILATERAL POLYCYSTIC OVARIAN SYNDROME: ICD-10-CM

## 2019-02-06 DIAGNOSIS — K31.84 GASTROPARESIS: ICD-10-CM

## 2019-02-06 DIAGNOSIS — E55.9 AVITAMINOSIS D: ICD-10-CM

## 2019-02-06 DIAGNOSIS — Z46.81 INSULIN PUMP TITRATION: ICD-10-CM

## 2019-02-06 DIAGNOSIS — E11.9 TYPE 2 DIABETES MELLITUS WITHOUT COMPLICATION (HCC): ICD-10-CM

## 2019-02-06 DIAGNOSIS — E11.41 DIABETIC MONONEUROPATHY ASSOCIATED WITH TYPE 2 DIABETES MELLITUS (HCC): ICD-10-CM

## 2019-02-06 DIAGNOSIS — E11.69 TYPE 2 DIABETES MELLITUS WITH OTHER SPECIFIED COMPLICATION, WITH LONG-TERM CURRENT USE OF INSULIN (HCC): Primary | ICD-10-CM

## 2019-02-06 DIAGNOSIS — E55.9 VITAMIN D DEFICIENCY: ICD-10-CM

## 2019-02-06 DIAGNOSIS — Z79.4 TYPE 2 DIABETES MELLITUS WITH OTHER SPECIFIED COMPLICATION, WITH LONG-TERM CURRENT USE OF INSULIN (HCC): Primary | ICD-10-CM

## 2019-02-06 PROCEDURE — 99214 OFFICE O/P EST MOD 30 MIN: CPT | Performed by: NURSE PRACTITIONER

## 2019-02-06 RX ORDER — INSULIN DEGLUDEC 200 U/ML
79 INJECTION, SOLUTION SUBCUTANEOUS DAILY
Qty: 3 PEN | Refills: 2 | Status: SHIPPED | OUTPATIENT
Start: 2019-02-06 | End: 2019-05-07 | Stop reason: SDUPTHER

## 2019-02-06 RX ORDER — NAPROXEN SODIUM 220 MG
TABLET ORAL
Qty: 200 EACH | Refills: 5 | Status: SHIPPED | OUTPATIENT
Start: 2019-02-06

## 2019-02-06 NOTE — PATIENT INSTRUCTIONS
Pump failure plan :  Take 79 units of a basal such as toujeo daily  Carb insulin ratio 1:15 and for correction 1:35 using humalog   Check for ketone as directed

## 2019-02-06 NOTE — PROGRESS NOTES
"Melinda Colon is a 43 y.o. female is here today for follow-up.  Chief Complaint   Patient presents with   • Diabetes     no recent labs, testing BG up to 5 times daily, pump download   • Hyperlipidemia     pt is not taking multiple medications   • Vitamin D Deficiency     /80   Ht 175.3 cm (69\")   Wt 122 kg (270 lb)   BMI 39.87 kg/m²   Current Outpatient Medications on File Prior to Visit   Medication Sig   • Cholecalciferol (VITAMIN D3) 5000 UNIT/ML liquid Take 5,000 Units by mouth Daily.   • glucagon (GLUCAGON EMERGENCY) 1 MG injection Inject 1 mg under the skin 1 (One) Time As Needed for low blood sugar for up to 1 dose.   • glucose blood test strip FreeStyle Test In Vitro Strip; Patient Sig: FreeStyle Test In Vitro Strip 4 times q day and prn  as directed; 150; 3; 21-Aug-2014; Active   • Insulin Disposable Pump (OMNIPOD) misc omnipods change every 1-2 days   • Insulin Infusion Pump device Inject 1 kit under the skin.   • insulin lispro (HUMALOG) 100 UNIT/ML injection UP  UNITS DAILY VIA INSULIN PUMP   • lactulose 20 GM/30ML solution solution Take 20 g by mouth As Needed.   • ondansetron ODT (ZOFRAN-ODT) 8 MG disintegrating tablet Take 8 mg by mouth Every 8 (Eight) Hours As Needed for Nausea or Vomiting.   • rizatriptan (MAXALT) 10 MG tablet Take 10 mg by mouth 1 (one) time as needed.   • rotigotine (NEUPRO) 1 MG/24HR patch 24 hour 24 hour patch Place 1 mg on the skin.   • diphenhydrAMINE (Q-DRYL) 12.5 MG/5ML liquid TK 10 ML PO Q 4 TO6 H PRF ITCHING   • Esomeprazole Magnesium (NEXIUM PO) Take 40 mg by mouth 2 (Two) Times a Day. Pt is using the powder form   • prochlorperazine (COMPAZINE) 25 MG suppository Compazine 25 MG Rectal Suppository; Patient Sig: Compazine 25 MG Rectal Suppository INSERT MG  PRN; 0; 21-Aug-2014; Active     No current facility-administered medications on file prior to visit.      History reviewed. No pertinent family history.  Social History     Tobacco Use   • " Smoking status: Light Tobacco Smoker   Substance Use Topics   • Alcohol use: Not on file   • Drug use: Not on file     Allergies   Allergen Reactions   • Cephalexin Nausea And Vomiting   • Doxycycline Nausea And Vomiting   • Erythromycin Itching and Nausea And Vomiting   • Hydrocodone Nausea And Vomiting and Rash   • Metoclopramide Other (See Comments)     FACIAL SPASMS  FACIAL SPASMS   • Morphine Itching and Nausea And Vomiting   • Promethazine Hcl Other (See Comments)     MUSCLE CONTRACTIONS  MUSCLE CONTRACTIONS   • Sulfa Antibiotics Nausea And Vomiting and Rash   • Vancomycin Nausea And Vomiting and Rash   • Codeine Rash   • Chlorhexidine Itching   • Chlorhexidine Gluconate Itching   • Clindamycin Nausea And Vomiting   • Ketorolac Tromethamine    • Morphine And Related    • Oxycodone Nausea And Vomiting   • Promethazine    • Sulfur    • Tramadol Nausea And Vomiting   • Latex Hives and Rash   • Tape Rash         History of Present Illness  Encounter Diagnoses   Name Primary?   • Hyperlipidemia, unspecified hyperlipidemia type    • Avitaminosis D    • Generalized obesity    • Vitamin D deficiency    • Diabetic mononeuropathy associated with type 2 diabetes mellitus (CMS/HCC)    • Insulin pump titration    • Type 2 diabetes mellitus with other specified complication, with long-term current use of insulin (CMS/HCC) Yes   43-year-old feel a patient here today for a follow-up visit.  She has missed her last several appointments and was needing a prescription refills.  She is had labs done at [facility] back last June.  She has had no recent labs done.  She states her last A1c was 8.3.  She is needing omni-pod supplies.  She was educated today how to dose insulin for a pump failure plan and prescriptions were sent for syringes and basal insulin to using the event that she runs out of supplies.  She is getting rate to have surgery done to have access skin removed from her abdomen.  She has a gastric pump and has  problems with gastroparesis.  She is questioning if a continuous glucose monitoring sensor would help her to better respond her blood sugars.    The following portions of the patient's history were reviewed and updated as appropriate: allergies, current medications, past family history, past medical history, past social history, past surgical history and problem list.    Review of Systems   Constitutional: Negative for fatigue.   HENT: Negative for trouble swallowing.    Eyes: Negative for visual disturbance.   Cardiovascular: Negative for leg swelling.   Endocrine: Negative for polyuria.   Skin: Negative for wound.   Neurological: Negative for numbness.       Objective   Physical Exam   Constitutional: She is oriented to person, place, and time. She appears well-developed and well-nourished. No distress.   HENT:   Head: Normocephalic and atraumatic.   Right Ear: External ear normal.   Left Ear: External ear normal.   Nose: Nose normal.   Mouth/Throat: Oropharynx is clear and moist. No oropharyngeal exudate.   Eyes: EOM are normal. Pupils are equal, round, and reactive to light. Right eye exhibits no discharge. Left eye exhibits no discharge.   Neck: Trachea normal, normal range of motion and full passive range of motion without pain. Neck supple. No tracheal tenderness present. Carotid bruit is not present. No tracheal deviation, no edema and no erythema present. No thyroid mass and no thyromegaly present.   Cardiovascular: Normal rate, regular rhythm, normal heart sounds and intact distal pulses. Exam reveals no gallop and no friction rub.   No murmur heard.  Pulmonary/Chest: Effort normal and breath sounds normal. No stridor. No respiratory distress. She has no wheezes. She has no rales.   Abdominal: Soft. Bowel sounds are normal. She exhibits no distension.   Musculoskeletal: Normal range of motion. She exhibits no edema or deformity.   Lymphadenopathy:     She has no cervical adenopathy.   Neurological: She is  alert and oriented to person, place, and time.   Skin: Skin is warm and dry. No rash noted. She is not diaphoretic. No erythema. No pallor.   Psychiatric: She has a normal mood and affect. Her behavior is normal. Judgment and thought content normal.   Nursing note and vitals reviewed.        Assessment/Plan   Problems Addressed this Visit        Cardiovascular and Mediastinum    Hyperlipidemia       Digestive    Vitamin D deficiency    Generalized obesity    Avitaminosis D       Endocrine    Type 2 diabetes mellitus (CMS/HCC) - Primary    Diabetic neuropathy (CMS/[facility])       Other    Insulin pump titration          In summary, patient was seen and examined.  She is to have extensive labs done externally.  I've asked that she contact the office to make sure that we receive the results.  She has been informed that we need to have lab results in order to continue to prescribe her medications and treat her diabetes.  He does strips were sent to her pharmacy.  She is also requesting her omni-pod's be sent.  She was educated today on how to dose insulin and the event of a pump failure or if she runs out of supplies.  Prescription for basal insulin was sent to her pharmacy along with syringes to draw per past acting insulin.  She is to follow-up in 4 months.  She's been advised that if she needs to cancel her appointment she needs to give a 24-hour advance notice patient is requesting a dexcom sensor and requests will be forwarded to [employer] to validate insurance coverage.  Her pump download was loaded.  She is typically checking her blood sugars once daily.  She has had no significant hypoglycemic events.  No basal settings have been changed based on her download

## 2019-02-14 ENCOUNTER — TELEPHONE (OUTPATIENT)
Dept: ENDOCRINOLOGY | Age: 44
End: 2019-02-14

## 2019-02-14 NOTE — TELEPHONE ENCOUNTER
Pt's last ovn has been faxed    ----- Message from Paula Cordova sent at 2/14/2019 10:20 AM EST -----  Contact: Leonora - Preop Testing RN  Leonora with [facility] Ph. [phone] and fax [phone] said patient is having panniculectomy surgery on 2-25-19. Pat requested last office visit note for anesthesiologist.

## 2019-03-06 ENCOUNTER — RESULTS ENCOUNTER (OUTPATIENT)
Dept: ENDOCRINOLOGY | Age: 44
End: 2019-03-06

## 2019-03-06 DIAGNOSIS — E78.5 HYPERLIPIDEMIA, UNSPECIFIED HYPERLIPIDEMIA TYPE: ICD-10-CM

## 2019-03-06 DIAGNOSIS — E55.9 AVITAMINOSIS D: ICD-10-CM

## 2019-03-06 DIAGNOSIS — E55.9 VITAMIN D DEFICIENCY: ICD-10-CM

## 2019-03-06 DIAGNOSIS — Z91.199 PATIENT NONCOMPLIANCE: ICD-10-CM

## 2019-03-06 DIAGNOSIS — E66.9 GENERALIZED OBESITY: ICD-10-CM

## 2019-03-06 DIAGNOSIS — Z46.81 INSULIN PUMP TITRATION: ICD-10-CM

## 2019-03-06 DIAGNOSIS — E11.41 DIABETIC MONONEUROPATHY ASSOCIATED WITH TYPE 2 DIABETES MELLITUS (HCC): ICD-10-CM

## 2019-03-06 DIAGNOSIS — Z79.4 TYPE 2 DIABETES MELLITUS WITH OTHER SPECIFIED COMPLICATION, WITH LONG-TERM CURRENT USE OF INSULIN (HCC): ICD-10-CM

## 2019-03-06 DIAGNOSIS — E11.69 TYPE 2 DIABETES MELLITUS WITH OTHER SPECIFIED COMPLICATION, WITH LONG-TERM CURRENT USE OF INSULIN (HCC): ICD-10-CM

## 2019-05-07 RX ORDER — INSULIN DEGLUDEC 200 U/ML
INJECTION, SOLUTION SUBCUTANEOUS
Qty: 9 ML | Refills: 4 | Status: SHIPPED | OUTPATIENT
Start: 2019-05-07

## 2019-05-24 ENCOUNTER — OFFICE (AMBULATORY)
Dept: URBAN - METROPOLITAN AREA CLINIC 75 | Facility: CLINIC | Age: 44
End: 2019-05-24
Payer: COMMERCIAL

## 2019-05-24 VITALS
HEART RATE: 68 BPM | DIASTOLIC BLOOD PRESSURE: 79 MMHG | SYSTOLIC BLOOD PRESSURE: 130 MMHG | HEIGHT: 69 IN | WEIGHT: 267 LBS

## 2019-05-24 DIAGNOSIS — R12 HEARTBURN: ICD-10-CM

## 2019-05-24 DIAGNOSIS — R11.2 NAUSEA WITH VOMITING, UNSPECIFIED: ICD-10-CM

## 2019-05-24 DIAGNOSIS — K59.00 CONSTIPATION, UNSPECIFIED: ICD-10-CM

## 2019-05-24 DIAGNOSIS — Z83.71 FAMILY HISTORY OF COLONIC POLYPS: ICD-10-CM

## 2019-05-24 DIAGNOSIS — K21.0 GASTRO-ESOPHAGEAL REFLUX DISEASE WITH ESOPHAGITIS: ICD-10-CM

## 2019-05-24 DIAGNOSIS — R13.10 DYSPHAGIA, UNSPECIFIED: ICD-10-CM

## 2019-05-24 DIAGNOSIS — K76.0 FATTY (CHANGE OF) LIVER, NOT ELSEWHERE CLASSIFIED: ICD-10-CM

## 2019-05-24 DIAGNOSIS — R94.5 ABNORMAL RESULTS OF LIVER FUNCTION STUDIES: ICD-10-CM

## 2019-05-24 DIAGNOSIS — E11.9 TYPE 2 DIABETES MELLITUS WITHOUT COMPLICATIONS: ICD-10-CM

## 2019-05-24 DIAGNOSIS — R10.13 EPIGASTRIC PAIN: ICD-10-CM

## 2019-05-24 DIAGNOSIS — K58.9 IRRITABLE BOWEL SYNDROME WITHOUT DIARRHEA: ICD-10-CM

## 2019-05-24 DIAGNOSIS — K31.84 GASTROPARESIS: ICD-10-CM

## 2019-05-24 PROCEDURE — 99213 OFFICE O/P EST LOW 20 MIN: CPT | Performed by: INTERNAL MEDICINE

## 2019-10-16 ENCOUNTER — OFFICE (AMBULATORY)
Dept: URBAN - METROPOLITAN AREA CLINIC 75 | Facility: CLINIC | Age: 44
End: 2019-10-16
Payer: COMMERCIAL

## 2019-10-16 VITALS
HEART RATE: 77 BPM | DIASTOLIC BLOOD PRESSURE: 79 MMHG | HEIGHT: 69 IN | WEIGHT: 257 LBS | SYSTOLIC BLOOD PRESSURE: 129 MMHG

## 2019-10-16 DIAGNOSIS — K21.0 GASTRO-ESOPHAGEAL REFLUX DISEASE WITH ESOPHAGITIS: ICD-10-CM

## 2019-10-16 DIAGNOSIS — K59.00 CONSTIPATION, UNSPECIFIED: ICD-10-CM

## 2019-10-16 DIAGNOSIS — K31.84 GASTROPARESIS: ICD-10-CM

## 2019-10-16 DIAGNOSIS — Z86.14 PERSONAL HISTORY OF METHICILLIN RESISTANT STAPHYLOCOCCUS AUR: ICD-10-CM

## 2019-10-16 DIAGNOSIS — Z83.71 FAMILY HISTORY OF COLONIC POLYPS: ICD-10-CM

## 2019-10-16 DIAGNOSIS — K76.0 FATTY (CHANGE OF) LIVER, NOT ELSEWHERE CLASSIFIED: ICD-10-CM

## 2019-10-16 DIAGNOSIS — Q45.3 OTHER CONGENITAL MALFORMATIONS OF PANCREAS AND PANCREATIC DU: ICD-10-CM

## 2019-10-16 DIAGNOSIS — E11.9 TYPE 2 DIABETES MELLITUS WITHOUT COMPLICATIONS: ICD-10-CM

## 2019-10-16 PROCEDURE — 99213 OFFICE O/P EST LOW 20 MIN: CPT | Performed by: INTERNAL MEDICINE

## 2019-10-16 RX ORDER — ONDANSETRON 8 MG/1
TABLET, ORALLY DISINTEGRATING ORAL
Qty: 90 | Refills: 4 | Status: ACTIVE

## 2019-10-16 RX ORDER — METHSCOPOLAMINE BROMIDE 5 MG/1
15 TABLET ORAL
Qty: 90 | Refills: 5 | Status: COMPLETED
Start: 2019-10-16 | End: 2020-12-02

## 2020-03-25 ENCOUNTER — OFFICE (AMBULATORY)
Dept: URBAN - METROPOLITAN AREA TELEHEALTH 6 | Facility: TELEHEALTH | Age: 45
End: 2020-03-25

## 2020-03-25 VITALS — HEIGHT: 69 IN

## 2020-03-25 DIAGNOSIS — K58.9 IRRITABLE BOWEL SYNDROME WITHOUT DIARRHEA: ICD-10-CM

## 2020-03-25 DIAGNOSIS — K76.0 FATTY (CHANGE OF) LIVER, NOT ELSEWHERE CLASSIFIED: ICD-10-CM

## 2020-03-25 DIAGNOSIS — Q45.3 OTHER CONGENITAL MALFORMATIONS OF PANCREAS AND PANCREATIC DU: ICD-10-CM

## 2020-03-25 DIAGNOSIS — R11.2 NAUSEA WITH VOMITING, UNSPECIFIED: ICD-10-CM

## 2020-03-25 DIAGNOSIS — R94.5 ABNORMAL RESULTS OF LIVER FUNCTION STUDIES: ICD-10-CM

## 2020-03-25 DIAGNOSIS — K21.0 GASTRO-ESOPHAGEAL REFLUX DISEASE WITH ESOPHAGITIS: ICD-10-CM

## 2020-03-25 DIAGNOSIS — Z83.71 FAMILY HISTORY OF COLONIC POLYPS: ICD-10-CM

## 2020-03-25 DIAGNOSIS — E11.9 TYPE 2 DIABETES MELLITUS WITHOUT COMPLICATIONS: ICD-10-CM

## 2020-03-25 DIAGNOSIS — K31.84 GASTROPARESIS: ICD-10-CM

## 2020-03-25 PROCEDURE — 99443 VIRTUAL CHECK-IN 21-30 MINUTES; COMMERCIAL INSURANCE: CPT | Performed by: INTERNAL MEDICINE

## 2020-03-25 RX ORDER — ONDANSETRON 8 MG/1
TABLET, ORALLY DISINTEGRATING ORAL
Qty: 90 | Refills: 4 | Status: ACTIVE

## 2020-03-25 RX ORDER — HYOSCYAMINE SULFATE 0.12 MG/1
TABLET SUBLINGUAL
Qty: 60 | Refills: 3 | Status: COMPLETED
Start: 2017-09-26 | End: 2024-08-01

## 2020-03-25 NOTE — SERVICEHPINOTES
3/25/20:   &lttelephone visit today during Coronavirus pandemic>Suellen is the first time I have talked to her since she had her pyloroplasty.  It seemed to help but then in January she had a very bad spell with nausea vomiting and she states it is 1 of the worst that she has had since I met her.  She went to the emergency department twice in the same week in January area CT scans are reviewed.  She had a kidney stone and thinks that could have been the beginning.  She ended up passing the kidney stone but states that she still has the stone possession.  She is very concerned if she has another attack and wants to avoid going to the hospital due to the coronavirus infectionBRSL Zofran helps some   also has Compazine supp [feels they don't work fast enough]BRport is out due to infection -- has a stitch eroding thru  has a "knot" at that siteMIKENguyen did see the infectious disease specialist and has some topical clindamycin and is supposed to check back in for further issues    BRstill with bouts of constipation -- taking a "fiber drink" from online ?Malaluca sp? hard to take but seems to help but $$ so does not think she can keep doing itBRLactulose works "too strong" so I told her to titrate to effect BRalso some IBS and diarrhea spells very scattered slmuink24/16/2019 THOMAS OWNE 44 1975 was seen in our [facility] office today for a follow up visit. ULP surgeons still want her to have Pyloroplasty she is reluctant wanted to talk to me BRstill has not had battery change in the Gastric stimulator so we seem to be in same place as last time BRHyoscyamine works well but ins denied itBRwt loss 10 more lbs BRwants to see ID b/c recurrent skin "boils"BRport is out in July due to infection so not getting IVF at home anymore but seems to be doing well 5/24/19: Doing fairly well since her last visit she had a panniculectomy and has recovered well although the surgery was a bit more involved than she expected. She is going to have replacement of a temporary gastric stimulator in 2 weeks and so she is very excited about that but she is reluctant to have the suggested pyloroplasty. That is an ongoing discussion between herself and  she has some nausea vomits occasionally will use oral Zofran when the nausea is mild and will use IV Zofran when it is severe. She is really only needing the IV fluids about 3 times a week which is very encouraging since her current stimulator is really not functional.Bowel habits are typically irregular and managed with lactulose and hyoscyamine but she only uses the latter about 3 or 4 times a month      BR

## 2020-03-25 NOTE — SERVICENOTES
25m 12sec phone call with pt plus review of [facility], collection of records and coordination of care 15 minutes

## 2020-08-12 ENCOUNTER — OFFICE (AMBULATORY)
Dept: URBAN - METROPOLITAN AREA CLINIC 75 | Facility: CLINIC | Age: 45
End: 2020-08-12

## 2020-08-12 VITALS — WEIGHT: 254 LBS | HEIGHT: 69 IN

## 2020-08-12 DIAGNOSIS — K58.9 IRRITABLE BOWEL SYNDROME WITHOUT DIARRHEA: ICD-10-CM

## 2020-08-12 DIAGNOSIS — Q45.3 OTHER CONGENITAL MALFORMATIONS OF PANCREAS AND PANCREATIC DU: ICD-10-CM

## 2020-08-12 DIAGNOSIS — E11.9 TYPE 2 DIABETES MELLITUS WITHOUT COMPLICATIONS: ICD-10-CM

## 2020-08-12 DIAGNOSIS — K31.84 GASTROPARESIS: ICD-10-CM

## 2020-08-12 DIAGNOSIS — Z83.71 FAMILY HISTORY OF COLONIC POLYPS: ICD-10-CM

## 2020-08-12 DIAGNOSIS — K59.00 CONSTIPATION, UNSPECIFIED: ICD-10-CM

## 2020-08-12 DIAGNOSIS — K76.0 FATTY (CHANGE OF) LIVER, NOT ELSEWHERE CLASSIFIED: ICD-10-CM

## 2020-08-12 DIAGNOSIS — R94.5 ABNORMAL RESULTS OF LIVER FUNCTION STUDIES: ICD-10-CM

## 2020-08-12 DIAGNOSIS — Z86.14 PERSONAL HISTORY OF METHICILLIN RESISTANT STAPHYLOCOCCUS AUR: ICD-10-CM

## 2020-08-12 DIAGNOSIS — R12 HEARTBURN: ICD-10-CM

## 2020-08-12 DIAGNOSIS — R11.2 NAUSEA WITH VOMITING, UNSPECIFIED: ICD-10-CM

## 2020-08-12 PROCEDURE — 99214 OFFICE O/P EST MOD 30 MIN: CPT | Performed by: INTERNAL MEDICINE

## 2020-08-12 RX ORDER — ESCITALOPRAM 20 MG/1
20 TABLET, FILM COATED ORAL
Qty: 30 | Refills: 5 | Status: COMPLETED
Start: 2020-08-12 | End: 2021-06-01

## 2020-08-12 RX ORDER — PANTOPRAZOLE SODIUM 40 MG/1
40 TABLET, DELAYED RELEASE ORAL
Qty: 30 | Refills: 6 | Status: COMPLETED
Start: 2020-08-12 | End: 2020-12-02

## 2020-09-21 ENCOUNTER — OFFICE (AMBULATORY)
Dept: URBAN - METROPOLITAN AREA CLINIC 75 | Facility: CLINIC | Age: 45
End: 2020-09-21

## 2020-10-27 ENCOUNTER — ON CAMPUS - OUTPATIENT (AMBULATORY)
Dept: URBAN - METROPOLITAN AREA HOSPITAL 108 | Facility: HOSPITAL | Age: 45
End: 2020-10-27
Payer: COMMERCIAL

## 2020-10-27 DIAGNOSIS — R10.11 RIGHT UPPER QUADRANT PAIN: ICD-10-CM

## 2020-10-27 DIAGNOSIS — K31.84 GASTROPARESIS: ICD-10-CM

## 2020-10-27 DIAGNOSIS — K21.00 GASTRO-ESOPHAGEAL REFLUX DISEASE WITH ESOPHAGITIS, WITHOUT B: ICD-10-CM

## 2020-10-27 DIAGNOSIS — R10.13 EPIGASTRIC PAIN: ICD-10-CM

## 2020-10-27 DIAGNOSIS — K29.60 OTHER GASTRITIS WITHOUT BLEEDING: ICD-10-CM

## 2020-10-27 DIAGNOSIS — R11.2 NAUSEA WITH VOMITING, UNSPECIFIED: ICD-10-CM

## 2020-10-27 PROCEDURE — 43239 EGD BIOPSY SINGLE/MULTIPLE: CPT | Performed by: INTERNAL MEDICINE

## 2021-04-02 ENCOUNTER — BULK ORDERING (OUTPATIENT)
Dept: CASE MANAGEMENT | Facility: OTHER | Age: 46
End: 2021-04-02

## 2021-04-02 DIAGNOSIS — Z23 IMMUNIZATION DUE: ICD-10-CM

## 2021-05-31 VITALS
TEMPERATURE: 97.2 F | OXYGEN SATURATION: 96 % | DIASTOLIC BLOOD PRESSURE: 84 MMHG | WEIGHT: 259 LBS | RESPIRATION RATE: 16 BRPM | SYSTOLIC BLOOD PRESSURE: 128 MMHG | HEIGHT: 69 IN | HEART RATE: 74 BPM

## 2021-06-01 ENCOUNTER — OFFICE (AMBULATORY)
Dept: URBAN - METROPOLITAN AREA CLINIC 75 | Facility: CLINIC | Age: 46
End: 2021-06-01
Payer: COMMERCIAL

## 2021-06-01 DIAGNOSIS — R94.5 ABNORMAL RESULTS OF LIVER FUNCTION STUDIES: ICD-10-CM

## 2021-06-01 DIAGNOSIS — K76.0 FATTY (CHANGE OF) LIVER, NOT ELSEWHERE CLASSIFIED: ICD-10-CM

## 2021-06-01 DIAGNOSIS — E11.9 TYPE 2 DIABETES MELLITUS WITHOUT COMPLICATIONS: ICD-10-CM

## 2021-06-01 DIAGNOSIS — K31.84 GASTROPARESIS: ICD-10-CM

## 2021-06-01 DIAGNOSIS — K21.0 GASTRO-ESOPHAGEAL REFLUX DISEASE WITH ESOPHAGITIS: ICD-10-CM

## 2021-06-01 DIAGNOSIS — R13.10 DYSPHAGIA, UNSPECIFIED: ICD-10-CM

## 2021-06-01 DIAGNOSIS — R11.2 NAUSEA WITH VOMITING, UNSPECIFIED: ICD-10-CM

## 2021-06-01 DIAGNOSIS — R12 HEARTBURN: ICD-10-CM

## 2021-06-01 DIAGNOSIS — Z83.71 FAMILY HISTORY OF COLONIC POLYPS: ICD-10-CM

## 2021-06-01 DIAGNOSIS — R10.13 EPIGASTRIC PAIN: ICD-10-CM

## 2021-06-01 DIAGNOSIS — K59.1 FUNCTIONAL DIARRHEA: ICD-10-CM

## 2021-06-01 DIAGNOSIS — K59.00 CONSTIPATION, UNSPECIFIED: ICD-10-CM

## 2021-06-01 PROCEDURE — 99214 OFFICE O/P EST MOD 30 MIN: CPT | Performed by: INTERNAL MEDICINE

## 2021-06-01 NOTE — SERVICEHPINOTES
2021  THOMAS OWEN  46  1975   was seen in our [facility] office today for a follow up visit.   takes dissolvable Zofran daily which allows her to function but incomplete relief but several times a month has a severe attack with vomiting  and this requires iv Zofran 1 or 2 times [4mg vial x2 = 1 dose]BRGastric stim "turned up" in march but worried battery will wear out if left too high BRoverall she is managing BRshe is scared to take COVID vaccine BRusing port only for Zofran and IVF  nickel allergy  cant find needles that are combatible   BRlabs 21 normal Lipids, Thyroid Stimulating Hormone - CBC normal, albumin 3.7, liver enzymes normal, potassium 3.8, CRP 2.2  BRfrustrated cannot lose weight     2020 THOMAS OWEN 45 1975 was seen in our [facility] office today for a follow up visit. feels that nausea is worse and heartburn is "extremely worse"BRsaw ENDO and "kidney function is low"BRsays she stays and bed and sleeps all the time BRhas to sleep sitting up b/c of heartburn only baking soda helps 50%BRfather  heart disease and best friend  heart disease so she is worried about her GI sx being cardiacBRlots of stress BRnausea/apin/heartburn start as soon "as she opens her eyes"BRno dysphagia last egd ??BRprotonix in past helped Nexium powder no help ----- BRmetformin makes her "violently ill"BRbowels alternate C&ampD hard to regulate Lactulose and HyoscyamineBRhas not seen Dr Escalera or Dr Cantu this year BR3/25/20: this is the first time I have talked to her since she had her pyloroplasty. It seemed to help but then in January she had a very bad spell with nausea vomiting and she states it is 1 of the worst that she has had since I met her. She went to the emergency department twice in the same week in January area CT scans are reviewed. She had a kidney stone and thinks that could have been the beginning. She ended up passing the kidney stone but states that she still has the stone possession. She is very concerned if she has another attack and wants to avoid going to the hospital due to the coronavirus infectionBRsl Zofran helps some also has Compazine supp [feels they don't work fast enough]BRwali is out due to infection -- has a stitch eroding thru has a "knot" at that siteMIKENguyen did see the infectious disease specialist and has some topical clindamycin and is supposed to check back in for further issues BRstill with bouts of constipation -- taking a "fiber drink" from online ?Malaluca sp? hard to take but seems to help but $$ so does not think she can keep doing itBRLactulose works "too strong" so I told her to titrate to effect BRalso some IBS and diarrhea spells very scattered [id number]/ THOMAS OWEN 44 1975 was seen in our [facility] office today for a follow up visit. ULP surgeons still want her to have Pyloroplasty she is reluctant wanted to talk to me BRstill has not had battery change in the Gastric stimulator so we seem to be in same place as last time BRHyoscyamine works well but ins denied itBRwt loss 10 more lbs BRwants to see ID b/c recurrent skin "boils"BRwali is out in July due to infection so not getting IVF at home anymore but seems to be doing well 19: Doing fairly well since her last visit she had a panniculectomy and has recovered well although the surgery was a bit more involved than she expected. She is going to have replacement of a temporary gastric stimulator in 2 weeks and so she is very excited about that but she is reluctant to have the suggested pyloroplasty. That is an ongoing discussion between herself and  she has some nausea vomits occasionally will use oral Zofran when the nausea is mild and will use IV Zofran when it is severe. She is really only needing the IV fluids about 3 times a week which is very encouraging since her current stimulator is really not functional.Bowel habits are typically irregular and managed with lactulose and hyoscyamine but she only uses the latter about 3 or 4 times a month

## 2021-10-08 ENCOUNTER — OFFICE (AMBULATORY)
Dept: URBAN - METROPOLITAN AREA CLINIC 75 | Facility: CLINIC | Age: 46
End: 2021-10-08

## 2021-10-08 DIAGNOSIS — R11.2 NAUSEA WITH VOMITING, UNSPECIFIED: ICD-10-CM

## 2021-12-01 ENCOUNTER — OFFICE (AMBULATORY)
Dept: URBAN - METROPOLITAN AREA CLINIC 75 | Facility: CLINIC | Age: 46
End: 2021-12-01

## 2021-12-01 VITALS
SYSTOLIC BLOOD PRESSURE: 118 MMHG | HEIGHT: 69 IN | OXYGEN SATURATION: 98 % | DIASTOLIC BLOOD PRESSURE: 74 MMHG | HEART RATE: 66 BPM | WEIGHT: 252 LBS

## 2021-12-01 DIAGNOSIS — R11.0 NAUSEA: ICD-10-CM

## 2021-12-01 DIAGNOSIS — R10.13 EPIGASTRIC PAIN: ICD-10-CM

## 2021-12-01 PROCEDURE — 99214 OFFICE O/P EST MOD 30 MIN: CPT | Performed by: INTERNAL MEDICINE

## 2021-12-01 RX ORDER — RIFAXIMIN 550 MG/1
TABLET ORAL
Qty: 42 | Refills: 2 | Status: COMPLETED
Start: 2021-12-01 | End: 2022-06-30

## 2021-12-01 RX ORDER — ONDANSETRON 8 MG/1
TABLET, ORALLY DISINTEGRATING ORAL
Qty: 90 | Refills: 4 | Status: ACTIVE

## 2021-12-01 RX ORDER — SCOLOPAMINE TRANSDERMAL SYSTEM 1 MG/1
PATCH, EXTENDED RELEASE TRANSDERMAL
Qty: 10 | Refills: 3 | Status: COMPLETED
Start: 2021-12-01 | End: 2023-05-16

## 2022-02-08 ENCOUNTER — ON CAMPUS - OUTPATIENT (AMBULATORY)
Dept: URBAN - METROPOLITAN AREA HOSPITAL 108 | Facility: HOSPITAL | Age: 47
End: 2022-02-08

## 2022-02-08 DIAGNOSIS — Z96.82 PRESENCE OF NEUROSTIMULATOR: ICD-10-CM

## 2022-02-08 DIAGNOSIS — E11.43 TYPE 2 DIABETES MELLITUS WITH DIABETIC AUTONOMIC (POLY)NEURO: ICD-10-CM

## 2022-02-08 DIAGNOSIS — R11.2 NAUSEA WITH VOMITING, UNSPECIFIED: ICD-10-CM

## 2022-02-08 DIAGNOSIS — U07.1 COVID-19: ICD-10-CM

## 2022-02-08 DIAGNOSIS — R10.11 RIGHT UPPER QUADRANT PAIN: ICD-10-CM

## 2022-02-08 DIAGNOSIS — K31.84 GASTROPARESIS: ICD-10-CM

## 2022-02-08 PROCEDURE — 99213 OFFICE O/P EST LOW 20 MIN: CPT | Performed by: INTERNAL MEDICINE

## 2022-02-28 ENCOUNTER — OFFICE (AMBULATORY)
Dept: URBAN - METROPOLITAN AREA CLINIC 75 | Facility: CLINIC | Age: 47
End: 2022-02-28

## 2022-02-28 DIAGNOSIS — R11.2 NAUSEA WITH VOMITING, UNSPECIFIED: ICD-10-CM

## 2022-02-28 DIAGNOSIS — R10.11 RIGHT UPPER QUADRANT PAIN: ICD-10-CM

## 2022-07-05 ENCOUNTER — OFFICE (AMBULATORY)
Dept: URBAN - METROPOLITAN AREA CLINIC 75 | Facility: CLINIC | Age: 47
End: 2022-07-05

## 2022-07-05 VITALS
DIASTOLIC BLOOD PRESSURE: 62 MMHG | SYSTOLIC BLOOD PRESSURE: 138 MMHG | HEART RATE: 66 BPM | OXYGEN SATURATION: 96 % | WEIGHT: 251 LBS | HEIGHT: 69 IN

## 2022-07-05 DIAGNOSIS — Z86.14 PERSONAL HISTORY OF METHICILLIN RESISTANT STAPHYLOCOCCUS AUR: ICD-10-CM

## 2022-07-05 DIAGNOSIS — R94.5 ABNORMAL RESULTS OF LIVER FUNCTION STUDIES: ICD-10-CM

## 2022-07-05 DIAGNOSIS — K58.0 IRRITABLE BOWEL SYNDROME WITH DIARRHEA: ICD-10-CM

## 2022-07-05 DIAGNOSIS — Q45.3 OTHER CONGENITAL MALFORMATIONS OF PANCREAS AND PANCREATIC DU: ICD-10-CM

## 2022-07-05 DIAGNOSIS — Z83.71 FAMILY HISTORY OF COLONIC POLYPS: ICD-10-CM

## 2022-07-05 DIAGNOSIS — E11.9 TYPE 2 DIABETES MELLITUS WITHOUT COMPLICATIONS: ICD-10-CM

## 2022-07-05 DIAGNOSIS — K76.0 FATTY (CHANGE OF) LIVER, NOT ELSEWHERE CLASSIFIED: ICD-10-CM

## 2022-07-05 DIAGNOSIS — R13.10 DYSPHAGIA, UNSPECIFIED: ICD-10-CM

## 2022-07-05 PROCEDURE — 99214 OFFICE O/P EST MOD 30 MIN: CPT | Performed by: INTERNAL MEDICINE

## 2022-07-05 RX ORDER — RIFAXIMIN 550 MG/1
TABLET ORAL
Qty: 42 | Refills: 2 | Status: COMPLETED
Start: 2022-07-05 | End: 2022-12-13

## 2022-12-13 ENCOUNTER — OFFICE (AMBULATORY)
Dept: URBAN - METROPOLITAN AREA CLINIC 76 | Facility: CLINIC | Age: 47
End: 2022-12-13

## 2022-12-13 VITALS
WEIGHT: 254 LBS | DIASTOLIC BLOOD PRESSURE: 70 MMHG | HEIGHT: 69 IN | HEART RATE: 74 BPM | SYSTOLIC BLOOD PRESSURE: 109 MMHG

## 2022-12-13 DIAGNOSIS — R10.11 RIGHT UPPER QUADRANT PAIN: ICD-10-CM

## 2022-12-13 DIAGNOSIS — K31.84 GASTROPARESIS: ICD-10-CM

## 2022-12-13 DIAGNOSIS — Z83.71 FAMILY HISTORY OF COLONIC POLYPS: ICD-10-CM

## 2022-12-13 DIAGNOSIS — E11.43 TYPE 2 DIABETES MELLITUS WITH DIABETIC AUTONOMIC (POLY)NEURO: ICD-10-CM

## 2022-12-13 DIAGNOSIS — Z86.14 PERSONAL HISTORY OF METHICILLIN RESISTANT STAPHYLOCOCCUS AUR: ICD-10-CM

## 2022-12-13 DIAGNOSIS — R10.31 RIGHT LOWER QUADRANT PAIN: ICD-10-CM

## 2022-12-13 DIAGNOSIS — Q45.3 OTHER CONGENITAL MALFORMATIONS OF PANCREAS AND PANCREATIC DU: ICD-10-CM

## 2022-12-13 DIAGNOSIS — K76.0 FATTY (CHANGE OF) LIVER, NOT ELSEWHERE CLASSIFIED: ICD-10-CM

## 2022-12-13 PROCEDURE — 99214 OFFICE O/P EST MOD 30 MIN: CPT | Performed by: INTERNAL MEDICINE

## 2022-12-13 RX ORDER — ONDANSETRON 8 MG/1
TABLET, ORALLY DISINTEGRATING ORAL
Qty: 90 | Refills: 4 | Status: ACTIVE

## 2023-05-16 ENCOUNTER — OFFICE (AMBULATORY)
Dept: URBAN - METROPOLITAN AREA CLINIC 76 | Facility: CLINIC | Age: 48
End: 2023-05-16

## 2023-05-16 VITALS
WEIGHT: 240 LBS | HEIGHT: 69 IN | DIASTOLIC BLOOD PRESSURE: 74 MMHG | SYSTOLIC BLOOD PRESSURE: 122 MMHG | OXYGEN SATURATION: 97 % | HEART RATE: 82 BPM

## 2023-05-16 DIAGNOSIS — K58.2 MIXED IRRITABLE BOWEL SYNDROME: ICD-10-CM

## 2023-05-16 DIAGNOSIS — R11.2 NAUSEA WITH VOMITING, UNSPECIFIED: ICD-10-CM

## 2023-05-16 DIAGNOSIS — K59.00 CONSTIPATION, UNSPECIFIED: ICD-10-CM

## 2023-05-16 DIAGNOSIS — Q45.3 OTHER CONGENITAL MALFORMATIONS OF PANCREAS AND PANCREATIC DU: ICD-10-CM

## 2023-05-16 DIAGNOSIS — K31.84 GASTROPARESIS: ICD-10-CM

## 2023-05-16 DIAGNOSIS — R12 HEARTBURN: ICD-10-CM

## 2023-05-16 DIAGNOSIS — K76.0 FATTY (CHANGE OF) LIVER, NOT ELSEWHERE CLASSIFIED: ICD-10-CM

## 2023-05-16 DIAGNOSIS — K21.00 GASTRO-ESOPHAGEAL REFLUX DISEASE WITH ESOPHAGITIS, WITHOUT B: ICD-10-CM

## 2023-05-16 PROCEDURE — 99214 OFFICE O/P EST MOD 30 MIN: CPT | Performed by: INTERNAL MEDICINE

## 2023-05-16 RX ORDER — PRUCALOPRIDE 2 MG/1
2 TABLET, FILM COATED ORAL
Qty: 30 | Refills: 11 | Status: ACTIVE
Start: 2023-05-16

## 2023-09-22 PROCEDURE — 82365 CALCULUS SPECTROSCOPY: CPT | Performed by: UROLOGY

## 2023-09-23 ENCOUNTER — LAB REQUISITION (OUTPATIENT)
Dept: LAB | Facility: HOSPITAL | Age: 48
End: 2023-09-23

## 2023-09-23 DIAGNOSIS — N20.0 CALCULUS OF KIDNEY: ICD-10-CM

## 2023-10-02 LAB
CALCIUM OXALATE DIHYDRATE MFR STONE IR: 60 %
COLOR STONE: NORMAL
COM MFR STONE: 40 %
COMPN STONE: NORMAL
LABORATORY COMMENT REPORT: NORMAL
LABORATORY COMMENT REPORT: NORMAL
Lab: NORMAL
Lab: NORMAL
PHOTO: NORMAL
SIZE STONE: NORMAL MM
SPEC SOURCE SUBJ: NORMAL
WT STONE: 73 MG

## 2024-01-31 VITALS
HEIGHT: 69 IN | HEART RATE: 81 BPM | WEIGHT: 213 LBS | DIASTOLIC BLOOD PRESSURE: 77 MMHG | SYSTOLIC BLOOD PRESSURE: 122 MMHG | OXYGEN SATURATION: 100 %

## 2024-02-01 ENCOUNTER — OFFICE (AMBULATORY)
Dept: URBAN - METROPOLITAN AREA CLINIC 76 | Facility: CLINIC | Age: 49
End: 2024-02-01

## 2024-02-01 DIAGNOSIS — R10.13 EPIGASTRIC PAIN: ICD-10-CM

## 2024-02-01 DIAGNOSIS — K58.2 MIXED IRRITABLE BOWEL SYNDROME: ICD-10-CM

## 2024-02-01 DIAGNOSIS — Z83.719 FAMILY HISTORY OF COLON POLYPS, UNSPECIFIED: ICD-10-CM

## 2024-02-01 DIAGNOSIS — R13.10 DYSPHAGIA, UNSPECIFIED: ICD-10-CM

## 2024-02-01 DIAGNOSIS — K31.84 GASTROPARESIS: ICD-10-CM

## 2024-02-01 DIAGNOSIS — K21.00 GASTRO-ESOPHAGEAL REFLUX DISEASE WITH ESOPHAGITIS, WITHOUT B: ICD-10-CM

## 2024-02-01 PROCEDURE — 99214 OFFICE O/P EST MOD 30 MIN: CPT | Performed by: INTERNAL MEDICINE

## 2024-02-01 RX ORDER — HYOSCYAMINE SULFATE 0.12 MG/1
TABLET SUBLINGUAL
Qty: 60 | Refills: 3 | Status: COMPLETED
Start: 2017-09-26 | End: 2024-08-01

## 2024-08-01 ENCOUNTER — OFFICE (AMBULATORY)
Dept: URBAN - METROPOLITAN AREA CLINIC 76 | Facility: CLINIC | Age: 49
End: 2024-08-01
Payer: COMMERCIAL

## 2024-08-01 VITALS — SYSTOLIC BLOOD PRESSURE: 107 MMHG | DIASTOLIC BLOOD PRESSURE: 71 MMHG | HEIGHT: 69 IN | WEIGHT: 213 LBS

## 2024-08-01 DIAGNOSIS — Z12.11 ENCOUNTER FOR SCREENING FOR MALIGNANT NEOPLASM OF COLON: ICD-10-CM

## 2024-08-01 DIAGNOSIS — Z83.719 FAMILY HISTORY OF COLON POLYPS, UNSPECIFIED: ICD-10-CM

## 2024-08-01 DIAGNOSIS — K31.84 GASTROPARESIS: ICD-10-CM

## 2024-08-01 DIAGNOSIS — R13.10 DYSPHAGIA, UNSPECIFIED: ICD-10-CM

## 2024-08-01 DIAGNOSIS — R10.31 RIGHT LOWER QUADRANT PAIN: ICD-10-CM

## 2024-08-01 DIAGNOSIS — K64.8 OTHER HEMORRHOIDS: ICD-10-CM

## 2024-08-01 DIAGNOSIS — K21.00 GASTRO-ESOPHAGEAL REFLUX DISEASE WITH ESOPHAGITIS, WITHOUT B: ICD-10-CM

## 2024-08-01 DIAGNOSIS — K59.00 CONSTIPATION, UNSPECIFIED: ICD-10-CM

## 2024-08-01 DIAGNOSIS — Q45.3 OTHER CONGENITAL MALFORMATIONS OF PANCREAS AND PANCREATIC DU: ICD-10-CM

## 2024-08-01 PROCEDURE — 99214 OFFICE O/P EST MOD 30 MIN: CPT

## 2024-08-01 RX ORDER — HYDROCORTISONE ACETATE 25 MG/1
25 SUPPOSITORY RECTAL
Qty: 1 | Refills: 1 | Status: ACTIVE
Start: 2024-08-01

## 2024-08-01 RX ORDER — PRUCALOPRIDE 2 MG/1
2 TABLET, FILM COATED ORAL
Qty: 90 | Refills: 3 | Status: COMPLETED
Start: 2024-08-01 | End: 2024-08-06

## 2024-08-02 VITALS
DIASTOLIC BLOOD PRESSURE: 48 MMHG | SYSTOLIC BLOOD PRESSURE: 108 MMHG | HEIGHT: 67 IN | OXYGEN SATURATION: 95 % | DIASTOLIC BLOOD PRESSURE: 65 MMHG | HEART RATE: 65 BPM | TEMPERATURE: 97.1 F | HEART RATE: 59 BPM | RESPIRATION RATE: 16 BRPM | SYSTOLIC BLOOD PRESSURE: 86 MMHG | WEIGHT: 211 LBS | HEART RATE: 63 BPM | RESPIRATION RATE: 18 BRPM | HEART RATE: 64 BPM | DIASTOLIC BLOOD PRESSURE: 46 MMHG | SYSTOLIC BLOOD PRESSURE: 96 MMHG | SYSTOLIC BLOOD PRESSURE: 112 MMHG | SYSTOLIC BLOOD PRESSURE: 81 MMHG | DIASTOLIC BLOOD PRESSURE: 45 MMHG | RESPIRATION RATE: 14 BRPM | HEART RATE: 67 BPM | OXYGEN SATURATION: 73 % | SYSTOLIC BLOOD PRESSURE: 82 MMHG | TEMPERATURE: 97.5 F | OXYGEN SATURATION: 97 % | DIASTOLIC BLOOD PRESSURE: 67 MMHG | RESPIRATION RATE: 2 BRPM | RESPIRATION RATE: 1 BRPM | OXYGEN SATURATION: 96 % | HEART RATE: 60 BPM | DIASTOLIC BLOOD PRESSURE: 36 MMHG | OXYGEN SATURATION: 98 % | DIASTOLIC BLOOD PRESSURE: 54 MMHG | SYSTOLIC BLOOD PRESSURE: 79 MMHG

## 2024-08-06 ENCOUNTER — OFFICE (AMBULATORY)
Dept: URBAN - METROPOLITAN AREA PATHOLOGY 4 | Facility: PATHOLOGY | Age: 49
End: 2024-08-06
Payer: COMMERCIAL

## 2024-08-06 ENCOUNTER — AMBULATORY SURGICAL CENTER (AMBULATORY)
Dept: URBAN - METROPOLITAN AREA SURGERY 17 | Facility: SURGERY | Age: 49
End: 2024-08-06
Payer: COMMERCIAL

## 2024-08-06 DIAGNOSIS — K31.84 GASTROPARESIS: ICD-10-CM

## 2024-08-06 DIAGNOSIS — K62.1 RECTAL POLYP: ICD-10-CM

## 2024-08-06 DIAGNOSIS — Z48.815 ENCOUNTER FOR SURGICAL AFTERCARE FOLLOWING SURGERY ON THE DI: ICD-10-CM

## 2024-08-06 DIAGNOSIS — K31.89 OTHER DISEASES OF STOMACH AND DUODENUM: ICD-10-CM

## 2024-08-06 DIAGNOSIS — K22.89 OTHER SPECIFIED DISEASE OF ESOPHAGUS: ICD-10-CM

## 2024-08-06 DIAGNOSIS — K63.5 POLYP OF COLON: ICD-10-CM

## 2024-08-06 DIAGNOSIS — R12 HEARTBURN: ICD-10-CM

## 2024-08-06 DIAGNOSIS — Z12.11 ENCOUNTER FOR SCREENING FOR MALIGNANT NEOPLASM OF COLON: ICD-10-CM

## 2024-08-06 DIAGNOSIS — R13.10 DYSPHAGIA, UNSPECIFIED: ICD-10-CM

## 2024-08-06 PROBLEM — D12.5 BENIGN NEOPLASM OF SIGMOID COLON: Status: ACTIVE | Noted: 2024-08-06

## 2024-08-06 LAB
GI HISTOLOGY: A. STOMACH ANTRUM: (no result)
GI HISTOLOGY: B. DISTAL SIGMOID COLON: (no result)
GI HISTOLOGY: C. RECTUM: (no result)
GI HISTOLOGY: PDF REPORT: (no result)

## 2024-08-06 PROCEDURE — 88342 IMHCHEM/IMCYTCHM 1ST ANTB: CPT | Performed by: PATHOLOGY

## 2024-08-06 PROCEDURE — 43450 DILATE ESOPHAGUS 1/MULT PASS: CPT | Performed by: INTERNAL MEDICINE

## 2024-08-06 PROCEDURE — 45380 COLONOSCOPY AND BIOPSY: CPT | Mod: 33 | Performed by: INTERNAL MEDICINE

## 2024-08-06 PROCEDURE — 43239 EGD BIOPSY SINGLE/MULTIPLE: CPT | Performed by: INTERNAL MEDICINE

## 2024-08-06 PROCEDURE — 88305 TISSUE EXAM BY PATHOLOGIST: CPT | Performed by: PATHOLOGY

## 2024-12-11 ENCOUNTER — OFFICE (AMBULATORY)
Dept: URBAN - METROPOLITAN AREA CLINIC 76 | Facility: CLINIC | Age: 49
End: 2024-12-11

## 2024-12-11 VITALS
HEIGHT: 67 IN | WEIGHT: 212 LBS | HEART RATE: 72 BPM | DIASTOLIC BLOOD PRESSURE: 76 MMHG | SYSTOLIC BLOOD PRESSURE: 116 MMHG

## 2024-12-11 DIAGNOSIS — K21.00 GASTRO-ESOPHAGEAL REFLUX DISEASE WITH ESOPHAGITIS, WITHOUT B: ICD-10-CM

## 2024-12-11 DIAGNOSIS — K31.84 GASTROPARESIS: ICD-10-CM

## 2024-12-11 DIAGNOSIS — K59.00 CONSTIPATION, UNSPECIFIED: ICD-10-CM

## 2024-12-11 DIAGNOSIS — E11.9 TYPE 2 DIABETES MELLITUS WITHOUT COMPLICATIONS: ICD-10-CM

## 2024-12-11 DIAGNOSIS — K58.1 IRRITABLE BOWEL SYNDROME WITH CONSTIPATION: ICD-10-CM

## 2024-12-11 PROBLEM — K63.5 POLYP OF COLON: Status: ACTIVE | Noted: 2024-08-06

## 2024-12-11 PROCEDURE — 99214 OFFICE O/P EST MOD 30 MIN: CPT

## 2024-12-11 RX ORDER — PRUCALOPRIDE 2 MG/1
2 TABLET, FILM COATED ORAL
Qty: 90 | Refills: 3 | Status: ACTIVE
Start: 2023-05-16

## 2025-06-17 ENCOUNTER — OFFICE (AMBULATORY)
Dept: URBAN - METROPOLITAN AREA CLINIC 76 | Facility: CLINIC | Age: 50
End: 2025-06-17
Payer: COMMERCIAL

## 2025-06-17 VITALS
SYSTOLIC BLOOD PRESSURE: 100 MMHG | HEART RATE: 74 BPM | OXYGEN SATURATION: 98 % | WEIGHT: 198 LBS | HEIGHT: 67 IN | DIASTOLIC BLOOD PRESSURE: 65 MMHG

## 2025-06-17 DIAGNOSIS — K31.84 GASTROPARESIS: ICD-10-CM

## 2025-06-17 DIAGNOSIS — K59.00 CONSTIPATION, UNSPECIFIED: ICD-10-CM

## 2025-06-17 DIAGNOSIS — K21.00 GASTRO-ESOPHAGEAL REFLUX DISEASE WITH ESOPHAGITIS, WITHOUT B: ICD-10-CM

## 2025-06-17 PROCEDURE — 99213 OFFICE O/P EST LOW 20 MIN: CPT

## 2025-06-17 RX ORDER — ONDANSETRON 8 MG/1
24 TABLET, ORALLY DISINTEGRATING ORAL
Qty: 90 | Refills: 4 | Status: ACTIVE
Start: 2025-06-17